# Patient Record
Sex: MALE | URBAN - METROPOLITAN AREA
[De-identification: names, ages, dates, MRNs, and addresses within clinical notes are randomized per-mention and may not be internally consistent; named-entity substitution may affect disease eponyms.]

---

## 2022-05-25 ENCOUNTER — OFFICE VISIT (OUTPATIENT)
Dept: URBAN - METROPOLITAN AREA CLINIC 3 | Facility: CLINIC | Age: 62
End: 2022-05-25

## 2022-05-25 DIAGNOSIS — B00.9 HERPESVIRAL INFECTION, UNSPECIFIED: Primary | ICD-10-CM

## 2022-05-25 PROCEDURE — 92002 INTRM OPH EXAM NEW PATIENT: CPT | Performed by: OPHTHALMOLOGY

## 2022-05-25 ASSESSMENT — INTRAOCULAR PRESSURE
OS: 16
OD: 22

## 2022-05-25 NOTE — IMPRESSION/PLAN
Impression: Herpesviral infection, unspecified: B00.9. Plan: Patient had HSV symptoms starting Wednesday (05/18/22) started acyclovir Monday (05/23/22) when was seen at [facility] ED. Cont. Acyclovir QID until runs out. Cont. Zirgan QID. Recommend sunglasses outside at all times and skin protection from the sun to prevent eyelid damage. Use pain medication as needed.  RTC in 1 month for a baseline dilated exam.

## 2022-08-01 NOTE — IMPRESSION/PLAN
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 Plan: Diabetes type II: signs of neovascularization noted. Will refer patient for a retinal consult to determine if treatment is necessary. Discussed ocular and systemic benefits of maintaining blood sugar control.

## 2022-08-01 NOTE — IMPRESSION/PLAN
Impression: Diabetes mellitus Type 2 with moderate non-proliferative retinopathy with macular edema, left eye: R91.9437. Plan: Diabetes type II: moderate background retinopathy noted, no signs of neovascularization noted. Discussed ocular and systemic benefits of blood sugar control. To retina for further evaluation.

## 2022-08-01 NOTE — IMPRESSION/PLAN
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 Plan: Dermatitis resolved but patient now shows signs of residual iritis. Stop erythromycin mauro, start Acyclovir QD for 30 days and PF 1% BID for 2 weeks and QD for 2 weeks. RTC in 4 week for F/U.

## 2022-08-01 NOTE — IMPRESSION/PLAN
Impression: Combined forms of age-related cataract, bilateral: H25.813. Plan: Moderate cataract- Appears to be visually significant and cataract extraction was is recommended to the patient, however, needs to evaluation by retina prior to consider a formal evaluation for surgery.

## 2022-09-14 NOTE — IMPRESSION/PLAN
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 Plan: Resolving-well.  Cont Acyclovir QD for 1 month, and PF 1% QD OD for one month

## 2022-09-14 NOTE — IMPRESSION/PLAN
Impression: Combined forms of age-related cataract, bilateral: H25.813. Plan: Moderate cataract- Appears to be visually significant and cataract extraction was is recommended to the patient, however, needs to finish the laser treatment for the diabetic retinopathy in [facility]. prior to consider a formal evaluation for surgery.